# Patient Record
Sex: MALE | Race: WHITE | NOT HISPANIC OR LATINO
[De-identification: names, ages, dates, MRNs, and addresses within clinical notes are randomized per-mention and may not be internally consistent; named-entity substitution may affect disease eponyms.]

---

## 2022-01-04 PROBLEM — Z00.00 ENCOUNTER FOR PREVENTIVE HEALTH EXAMINATION: Status: ACTIVE | Noted: 2022-01-04

## 2022-01-06 ENCOUNTER — APPOINTMENT (OUTPATIENT)
Dept: VASCULAR SURGERY | Facility: CLINIC | Age: 57
End: 2022-01-06

## 2022-01-25 ENCOUNTER — NON-APPOINTMENT (OUTPATIENT)
Age: 57
End: 2022-01-25

## 2022-01-28 ENCOUNTER — APPOINTMENT (OUTPATIENT)
Dept: VASCULAR SURGERY | Facility: CLINIC | Age: 57
End: 2022-01-28
Payer: COMMERCIAL

## 2022-01-28 ENCOUNTER — NON-APPOINTMENT (OUTPATIENT)
Age: 57
End: 2022-01-28

## 2022-01-28 VITALS — SYSTOLIC BLOOD PRESSURE: 168 MMHG | DIASTOLIC BLOOD PRESSURE: 97 MMHG | HEART RATE: 69 BPM

## 2022-01-28 DIAGNOSIS — Z72.89 OTHER PROBLEMS RELATED TO LIFESTYLE: ICD-10-CM

## 2022-01-28 DIAGNOSIS — I10 ESSENTIAL (PRIMARY) HYPERTENSION: ICD-10-CM

## 2022-01-28 DIAGNOSIS — Z83.3 FAMILY HISTORY OF DIABETES MELLITUS: ICD-10-CM

## 2022-01-28 DIAGNOSIS — Z86.39 PERSONAL HISTORY OF OTHER ENDOCRINE, NUTRITIONAL AND METABOLIC DISEASE: ICD-10-CM

## 2022-01-28 PROCEDURE — 99204 OFFICE O/P NEW MOD 45 MIN: CPT

## 2022-01-28 PROCEDURE — 93971 EXTREMITY STUDY: CPT

## 2022-01-28 RX ORDER — LEVOTHYROXINE SODIUM 0.17 MG/1
TABLET ORAL
Refills: 0 | Status: ACTIVE | COMMUNITY

## 2022-01-28 RX ORDER — OLMESARTAN MEDOXOMIL 40 MG/1
40 TABLET, FILM COATED ORAL
Refills: 0 | Status: ACTIVE | COMMUNITY

## 2022-01-28 RX ORDER — AMLODIPINE BESYLATE 5 MG/1
5 TABLET ORAL
Refills: 0 | Status: ACTIVE | COMMUNITY

## 2022-01-28 NOTE — HISTORY OF PRESENT ILLNESS
[FreeTextEntry1] : 56-year-old male referred by Dr. Lou for evaluation of left leg edema.\par Reports that his symptoms initially began in September of last year.  He developed some pain, redness and minimal swelling of his distal leg on below the ankle.  He was initially treated with a course of antibiotics which did resolve these minimal symptoms.  The symptom complex recurred around Thanksgiving, and this time was accompanied by swelling, so he underwent a venous duplex of the leg which was negative for DVT or SVT.\par The redness and swelling, although quite minimal, has not resolved.\par He denies a prior history of DVT.  He denies a family history of DVT or PE.  He denies a personal history of malignancy.\par He does take amlodipine for history of hypertension.

## 2022-01-28 NOTE — PHYSICAL EXAM
[Normal Breath Sounds] : Normal breath sounds [Ankle Swelling (On Exam)] : present [Ankle Swelling On The Left] : of the left ankle [Ankle Swelling On The Right] : mild [Alert] : alert [Oriented to Person] : oriented to person [Oriented to Place] : oriented to place [Calm] : calm [2+] : left 2+ [1+] : left 1+ [de-identified] : NAD [de-identified] : NCAT [de-identified] : supple [de-identified] : soft, nt, nd; no palpable masses [de-identified] : Left medial distal calf small area of phlebitic vein, minimal overlying tenderness, no erythema

## 2022-01-28 NOTE — ASSESSMENT
[FreeTextEntry1] : 56-year-old male with history of left leg tenderness and edema, found to have phlebitis of his focal varicose veins of his left leg, also found with superficial venous reflux, more extensive in the SSV.\par I recommended supportive care of the focal phlebitis including the application of warm compresses, NSAIDs for 10 days, after which he will continue aspirin daily, as well as compression garments grade 2 which I prescribed.\par He will follow up with me in approximately 4 to 6 weeks to ensure clinical resolution, at which point we will discuss ablative treatment of the SSV to avoid recurrence.

## 2022-01-28 NOTE — REVIEW OF SYSTEMS
16-May-2018 [Lower Ext Edema] : lower extremity edema [As Noted in HPI] : as noted in HPI [Negative] : Psychiatric

## 2022-01-28 NOTE — PROCEDURE
[FreeTextEntry1] : LLE venous US - no DVT.  Very small focal phlebitis of the varicose vein at the distal calf, no other major SVT.\par No deep venous reflux, there is focal GSV reflux in the thigh, there is more extensive SSV reflux which causes reflux into  branches and varicose veins in the distal calf.\par

## 2022-01-28 NOTE — DATA REVIEWED
[FreeTextEntry1] : US DUPLEX CARLEY LOWER EXTREMITY LEFT \par  \par Clinical statement:[id number]: Localized edema,    C/C:r/o dvt   \par   \par Technique:  The deep venous system of the left lower extremity  was evaluated \par using gray scale and color and spectral Doppler imaging. \par   \par Comparison:  None available \par   \par Left: The common femoral, femoral, popliteal veins were evaluated. There is \par normal flow seen with respiratory variation, augmentation, and normal \par compressibility. There is symmetric respiratory variation in the contralateral \par common femoral vein. The proximal  greater saphenous vein and visualized calf \par veins are also normal and compressible.  \par   \par   \par Impression: No evidence of deep venous thrombosis in the left femoropopliteal \par system. \par   \par Electronically Signed in [employer] By Dr. Pau Sheffield MD on \par 11/24/2021 3:23 PM

## 2022-01-28 NOTE — CONSULT LETTER
[Dear  ___] : Dear  [unfilled], [Consult Letter:] : I had the pleasure of evaluating your patient, [unfilled]. [Please see my note below.] : Please see my note below. [Consult Closing:] : Thank you very much for allowing me to participate in the care of this patient.  If you have any questions, please do not hesitate to contact me. [Sincerely,] : Sincerely, [FreeTextEntry3] : Braydon Mora MD, RPVI

## 2022-03-17 ENCOUNTER — APPOINTMENT (OUTPATIENT)
Dept: VASCULAR SURGERY | Facility: CLINIC | Age: 57
End: 2022-03-17
Payer: COMMERCIAL

## 2022-03-17 VITALS — HEART RATE: 62 BPM | SYSTOLIC BLOOD PRESSURE: 160 MMHG | DIASTOLIC BLOOD PRESSURE: 101 MMHG

## 2022-03-17 PROCEDURE — 99214 OFFICE O/P EST MOD 30 MIN: CPT

## 2022-03-17 NOTE — HISTORY OF PRESENT ILLNESS
[FreeTextEntry1] : 56-year-old male referred by Dr. Lou for evaluation of left leg edema.\par Reports that his symptoms initially began in September of last year.  He developed some pain, redness and minimal swelling of his distal leg on below the ankle.  He was initially treated with a course of antibiotics which did resolve these minimal symptoms.  The symptom complex recurred around Thanksgiving, and this time was accompanied by swelling, so he underwent a venous duplex of the leg which was negative for DVT or SVT.\par The redness and swelling, although quite minimal, has not resolved.\par He denies a prior history of DVT.  He denies a family history of DVT or PE.  He denies a personal history of malignancy.\par He does take amlodipine for history of hypertension. [de-identified] : 3/17/22 - He has been compliant with the daily use of 20 to 30 mmHg compression garments.  The hardness and redness overlying the varicose veins of his left calf has improved largely resolved.  Continues to have discomfort associated with the varicose veins as well as heaviness of his leg.

## 2022-03-17 NOTE — ASSESSMENT
[FreeTextEntry1] : 56-year-old male with history of left leg tenderness and edema, found to have phlebitis of his focal varicose veins of his left leg, also found with superficial venous reflux, more extensive in the SSV.\par On follow-up, the symptoms of phlebitis have resolved, however he continues to have discomfort associated with the varicose veins as well as heaviness of his leg.  This is despite the daily use of 20 to 30 mmHg compression garments and elevation of his legs.\par We discussed ablative treatment of his superficial venous reflux specifically in his SSV to alleviate reflux into these varicose veins of concern which were recently phlebitic, and to alleviate his symptoms of venous reflux.  He wishes to proceed.\par We will schedule left leg RFA of SSV as per the patient schedule, he will continue the daily use of compression garments.

## 2022-03-17 NOTE — PHYSICAL EXAM
[Normal Breath Sounds] : Normal breath sounds [2+] : right 2+ [1+] : left 1+ [Ankle Swelling (On Exam)] : present [Ankle Swelling On The Left] : of the left ankle [Ankle Swelling On The Right] : mild [Alert] : alert [Oriented to Person] : oriented to person [Oriented to Place] : oriented to place [Calm] : calm [de-identified] : NAD [de-identified] : NCAT [de-identified] : supple [de-identified] : soft, nt, nd; no palpable masses [de-identified] : Left medial distal calf small area of phlebitic vein, previously erythematous and tender to palpation which is now resolved

## 2022-03-17 NOTE — REVIEW OF SYSTEMS
[Lower Ext Edema] : lower extremity edema [As Noted in HPI] : as noted in HPI [Negative] : Psychiatric

## 2022-05-12 ENCOUNTER — APPOINTMENT (OUTPATIENT)
Dept: VASCULAR SURGERY | Facility: CLINIC | Age: 57
End: 2022-05-12
Payer: COMMERCIAL

## 2022-05-12 VITALS — DIASTOLIC BLOOD PRESSURE: 76 MMHG | HEART RATE: 73 BPM | SYSTOLIC BLOOD PRESSURE: 153 MMHG

## 2022-05-12 VITALS — SYSTOLIC BLOOD PRESSURE: 158 MMHG | HEART RATE: 61 BPM | DIASTOLIC BLOOD PRESSURE: 78 MMHG

## 2022-05-12 PROCEDURE — 36475 ENDOVENOUS RF 1ST VEIN: CPT

## 2022-05-12 NOTE — ADDENDUM
[FreeTextEntry1] : The patient was given verbal and written instructions. The patient verbalized understanding of the instructions. Will follow up next week  for post procedure u/s.

## 2022-05-12 NOTE — PROCEDURE
[FreeTextEntry1] : left leg RFA of SSV [FreeTextEntry2] : Left leg symptomatic superficial venous reflux, history of superficial thrombophlebitis [FreeTextEntry3] : The patient was seen in the waiting room where the consent was obtained. He was then taken to the procedure room where a timeout was called to verify the patient's identity and the laterality of the procedure. The patient's left leg was then interrogated under ultrasound and an appropriate place for cannulation was identified. The leg was then prepped and draped in the standard fashion. Under ultrasound guidance the patient was given an injection of 1% plain lidocaine in the distal posterior calf. Access was then obtained with a 7 FR sheath in the standard fashion. Catheter was placed to the SPJ and withdrawn 2.5 cm from the junction. Patient was then given tumescence around the saphenous vein under ultrasound guidance. The ablation was then performed. Steri strips were applied to the catheter insertion site. Leg was wrapped in kerlix, and an ace wrap. Patient was discharged in stable condition.

## 2022-05-19 ENCOUNTER — APPOINTMENT (OUTPATIENT)
Dept: VASCULAR SURGERY | Facility: CLINIC | Age: 57
End: 2022-05-19
Payer: COMMERCIAL

## 2022-05-19 PROCEDURE — 99213 OFFICE O/P EST LOW 20 MIN: CPT

## 2022-05-19 PROCEDURE — 93971 EXTREMITY STUDY: CPT

## 2022-05-19 NOTE — PHYSICAL EXAM
[Normal Breath Sounds] : Normal breath sounds [2+] : right 2+ [1+] : left 1+ [Ankle Swelling (On Exam)] : present [Ankle Swelling On The Left] : of the left ankle [Ankle Swelling On The Right] : mild [Alert] : alert [Oriented to Person] : oriented to person [Oriented to Place] : oriented to place [Calm] : calm [de-identified] : NAD [de-identified] : NCAT [de-identified] : supple [de-identified] : soft, nt, nd; no palpable masses [de-identified] : Left medial distal calf small area of phlebitic vein, previously erythematous and tender to palpation which is now resolved

## 2022-05-19 NOTE — ASSESSMENT
[FreeTextEntry1] : 56-year-old male with history of left leg tenderness and edema, found to have phlebitis of his focal varicose veins of his left leg, also found with superficial venous reflux, more extensive in the SSV.\par On follow-up, the symptoms of phlebitis resolved, however he continued to have discomfort associated with the varicose veins as well as heaviness of his leg.  This is despite the daily use of 20 to 30 mmHg compression garments and elevation of his legs.  He underwent left leg RFA of SSV 1 week ago, and he has done well post procedure.  His postprocedure ultrasound is within normal limits.  His symptoms are much improved.\par He will continue compression on a daily basis, he will follow-up with me in approximately 6 to 8 weeks for final evaluation.

## 2022-05-19 NOTE — HISTORY OF PRESENT ILLNESS
[FreeTextEntry1] : 56-year-old male referred by Dr. Lou for evaluation of left leg edema.\par Reports that his symptoms initially began in September of last year.  He developed some pain, redness and minimal swelling of his distal leg on below the ankle.  He was initially treated with a course of antibiotics which did resolve these minimal symptoms.  The symptom complex recurred around Thanksgiving, and this time was accompanied by swelling, so he underwent a venous duplex of the leg which was negative for DVT or SVT.\par The redness and swelling, although quite minimal, has not resolved.\par He denies a prior history of DVT.  He denies a family history of DVT or PE.  He denies a personal history of malignancy.\par He does take amlodipine for history of hypertension. [de-identified] : 3/17/22 - He has been compliant with the daily use of 20 to 30 mmHg compression garments.  The hardness and redness overlying the varicose veins of his left calf has improved largely resolved.  Continues to have discomfort associated with the varicose veins as well as heaviness of his leg.\par \par 5/19/22 - He is 1 week status post left SSV RFA.  He was compliant with post procedure compression and instructions.  He reports that he is doing well, denies pain, swelling.

## 2022-07-07 ENCOUNTER — APPOINTMENT (OUTPATIENT)
Dept: VASCULAR SURGERY | Facility: CLINIC | Age: 57
End: 2022-07-07

## 2022-07-07 VITALS — HEIGHT: 73 IN | BODY MASS INDEX: 38.43 KG/M2 | WEIGHT: 290 LBS

## 2022-07-07 DIAGNOSIS — I80.02 PHLEBITIS AND THROMBOPHLEBITIS OF SUPERFICIAL VESSELS OF LEFT LOWER EXTREMITY: ICD-10-CM

## 2022-07-07 DIAGNOSIS — I87.2 VENOUS INSUFFICIENCY (CHRONIC) (PERIPHERAL): ICD-10-CM

## 2022-07-07 PROCEDURE — 99213 OFFICE O/P EST LOW 20 MIN: CPT

## 2022-07-07 RX ORDER — ATORVASTATIN CALCIUM 20 MG/1
20 TABLET, FILM COATED ORAL
Qty: 90 | Refills: 0 | Status: ACTIVE | COMMUNITY
Start: 2022-04-12

## 2022-07-07 RX ORDER — ERGOCALCIFEROL 1.25 MG/1
1.25 MG CAPSULE, LIQUID FILLED ORAL
Qty: 13 | Refills: 0 | Status: ACTIVE | COMMUNITY
Start: 2022-05-18

## 2022-07-07 RX ORDER — TRIAMCINOLONE ACETONIDE 1 MG/G
0.1 CREAM TOPICAL
Qty: 80 | Refills: 0 | Status: ACTIVE | COMMUNITY
Start: 2022-02-03

## 2022-07-07 NOTE — HISTORY OF PRESENT ILLNESS
[FreeTextEntry1] : 56-year-old male referred by Dr. Lou for evaluation of left leg edema.\par Reports that his symptoms initially began in September of last year.  He developed some pain, redness and minimal swelling of his distal leg on below the ankle.  He was initially treated with a course of antibiotics which did resolve these minimal symptoms.  The symptom complex recurred around Thanksgiving, and this time was accompanied by swelling, so he underwent a venous duplex of the leg which was negative for DVT or SVT.\par The redness and swelling, although quite minimal, has not resolved.\par He denies a prior history of DVT.  He denies a family history of DVT or PE.  He denies a personal history of malignancy.\par He does take amlodipine for history of hypertension. [de-identified] : 3/17/22 - He has been compliant with the daily use of 20 to 30 mmHg compression garments.  The hardness and redness overlying the varicose veins of his left calf has improved largely resolved.  Continues to have discomfort associated with the varicose veins as well as heaviness of his leg.\par \par 5/19/22 - He is 1 week status post left SSV RFA.  He was compliant with post procedure compression and instructions.  He reports that he is doing well, denies pain, swelling.\par \par 7/7/22 - He continues to do quite well.  Varicose veins are no longer feeling, no longer apparent.  He denies pain or discomfort with his left leg.

## 2022-07-07 NOTE — ASSESSMENT
[FreeTextEntry1] : 56-year-old male with history of left leg tenderness and edema, found to have phlebitis of his focal varicose veins of his left leg, also found with superficial venous reflux, more extensive in the SSV.\par The symptoms of phlebitis resolved, however he continued to have discomfort associated with the varicose veins as well as heaviness of his leg.  This is despite the daily use of 20 to 30 mmHg compression garments and elevation of his legs.  He underwent left leg RFA of SSV, and he has done well post procedure.  His postprocedure ultrasound is within normal limits.  His symptoms are much improved.\par His varicose veins are no longer apparent and his left leg is in very good shape.\par He will follow-up with me if he develops recurrence of varicose veins or venous symptoms.

## 2022-07-07 NOTE — PHYSICAL EXAM
[Normal Breath Sounds] : Normal breath sounds [2+] : right 2+ [1+] : left 1+ [Ankle Swelling (On Exam)] : present [Ankle Swelling On The Left] : of the left ankle [Ankle Swelling On The Right] : mild [Alert] : alert [Oriented to Person] : oriented to person [Oriented to Place] : oriented to place [Calm] : calm [de-identified] : NAD [de-identified] : NCAT [de-identified] : supple [de-identified] : soft, nt, nd; no palpable masses [de-identified] : No gross left lower extremity varicosities